# Patient Record
(demographics unavailable — no encounter records)

---

## 2024-10-24 NOTE — HISTORY OF PRESENT ILLNESS
[de-identified] : Pt presents to the office today for CPE and FBW Pt has been feeling well overall Diet and exercise are doing well.  She has been getting some intermittent leg swelling as well as swelling in varicose veins in legs

## 2024-10-24 NOTE — HEALTH RISK ASSESSMENT
[Good] : ~his/her~  mood as  good [No falls in past year] : Patient reported no falls in the past year [Little interest or pleasure doing things] : 1) Little interest or pleasure doing things [Feeling down, depressed, or hopeless] : 2) Feeling down, depressed, or hopeless [0] : 2) Feeling down, depressed, or hopeless: Not at all (0) [PHQ-2 Negative - No further assessment needed] : PHQ-2 Negative - No further assessment needed [Former] : Former [15-19] : 15-19 [> 15 Years] : > 15 Years [Patient reported mammogram was normal] : Patient reported mammogram was normal [Patient reported PAP Smear was normal] : Patient reported PAP Smear was normal [Patient reported colonoscopy was normal] : Patient reported colonoscopy was normal [With Significant Other] : lives with significant other [] :  [Fully functional (bathing, dressing, toileting, transferring, walking, feeding)] : Fully functional (bathing, dressing, toileting, transferring, walking, feeding) [Fully functional (using the telephone, shopping, preparing meals, housekeeping, doing laundry, using] : Fully functional and needs no help or supervision to perform IADLs (using the telephone, shopping, preparing meals, housekeeping, doing laundry, using transportation, managing medications and managing finances) [[id number]] : 0 [Change in mental status noted] : No change in mental status noted [Language] : denies difficulty with language [MammogramDate] : 09/24 [PapSmearDate] : 12/23 [ColonoscopyDate] : 06/24 [ColonoscopyComments] : 5 year follow up

## 2024-10-24 NOTE — PHYSICAL EXAM
[No Acute Distress] : no acute distress [Well Nourished] : well nourished [Well Developed] : well developed [Well-Appearing] : well-appearing [Normal Sclera/Conjunctiva] : normal sclera/conjunctiva [PERRL] : pupils equal round and reactive to light [EOMI] : extraocular movements intact [Normal Outer Ear/Nose] : the outer ears and nose were normal in appearance [Normal Oropharynx] : the oropharynx was normal [No JVD] : no jugular venous distention [No Lymphadenopathy] : no lymphadenopathy [Supple] : supple [Thyroid Normal, No Nodules] : the thyroid was normal and there were no nodules present [No Respiratory Distress] : no respiratory distress  [No Accessory Muscle Use] : no accessory muscle use [Clear to Auscultation] : lungs were clear to auscultation bilaterally [Normal Rate] : normal rate  [Regular Rhythm] : with a regular rhythm [Normal S1, S2] : normal S1 and S2 [No Murmur] : no murmur heard [No Carotid Bruits] : no carotid bruits [No Abdominal Bruit] : a ~M bruit was not heard ~T in the abdomen [Pedal Pulses Present] : the pedal pulses are present [No Edema] : there was no peripheral edema [No Palpable Aorta] : no palpable aorta [No Extremity Clubbing/Cyanosis] : no extremity clubbing/cyanosis [Soft] : abdomen soft [Non Tender] : non-tender [Non-distended] : non-distended [No Masses] : no abdominal mass palpated [No HSM] : no HSM [Normal Bowel Sounds] : normal bowel sounds [Normal Posterior Cervical Nodes] : no posterior cervical lymphadenopathy [Normal Anterior Cervical Nodes] : no anterior cervical lymphadenopathy [No CVA Tenderness] : no CVA  tenderness [No Spinal Tenderness] : no spinal tenderness [No Joint Swelling] : no joint swelling [Grossly Normal Strength/Tone] : grossly normal strength/tone [No Rash] : no rash [Coordination Grossly Intact] : coordination grossly intact [No Focal Deficits] : no focal deficits [Normal Gait] : normal gait [Deep Tendon Reflexes (DTR)] : deep tendon reflexes were 2+ and symmetric [Normal Affect] : the affect was normal [Normal Insight/Judgement] : insight and judgment were intact [de-identified] : Varicose veins LE normal pulses no edema on exam

## 2024-10-24 NOTE — PLAN
[FreeTextEntry1] : EKG NSR @83 Derm Yearly  GYN yearly  Recommend vascular consult Start compression stockings when walking and standing for prolonged periods of time.  FBW done in office today  Pt was given influenza vaccination today.  Tolerated vaccination well with no acute reactions. Discussed vaccination with pt and answered all questions.

## 2025-02-27 NOTE — REVIEW OF SYSTEMS
[Abdominal Pain] : no abdominal pain [Nausea] : nausea [Constipation] : no constipation [Diarrhea] : diarrhea [Vomiting] : no vomiting [Melena] : no melena [FreeTextEntry7] : Epigastric tenderness

## 2025-02-27 NOTE — HISTORY OF PRESENT ILLNESS
[FreeTextEntry8] : Patient presents to the office today with diarrhea for the last 2 weeks and epigastric tenderness and cramping Patient had no further abdominal pains no vomiting slight nausea Patient states today she is having little bit of a normal bowel movement No fevers no chills no bodyaches

## 2025-02-27 NOTE — REVIEW OF SYSTEMS
[Abdominal Pain] : no abdominal pain [Nausea] : nausea [Constipation] : no constipation [Diarrhea] : diarrhea [Vomiting] : no vomiting [Melena] : no melena [FreeTextEntry7] : Epigastric tenderness no

## 2025-02-27 NOTE — PLAN
[FreeTextEntry1] : Patient will have GI PCR workup for further evaluation Patient will start taking Pepcid for nausea and heartburn Continue with bland diet Patient does have some improvement bowel movements today Follow-up with any new or worsening symptoms Patient will be advised blood work results once reviewed Patient will go for abdominal ultrasound and x-ray of abdomen to rule out any signs of distention or blockage further blood work with lipase to rule out any pancreatic abnormalities since patient is having left upper quadrant tenderness and abdomen Patient's had no significant weight loss

## 2025-02-27 NOTE — PHYSICAL EXAM
[de-identified] : No abdominal masses normal bowel sounds nondistended mild tenderness in epigastric region with palpation

## 2025-02-27 NOTE — PHYSICAL EXAM
[de-identified] : No abdominal masses normal bowel sounds nondistended mild tenderness in epigastric region with palpation